# Patient Record
Sex: FEMALE | Race: WHITE | Employment: OTHER | URBAN - METROPOLITAN AREA
[De-identification: names, ages, dates, MRNs, and addresses within clinical notes are randomized per-mention and may not be internally consistent; named-entity substitution may affect disease eponyms.]

---

## 2018-12-29 ENCOUNTER — TELEPHONE (OUTPATIENT)
Dept: OTHER | Age: 53
End: 2018-12-29

## 2019-05-22 ENCOUNTER — OFFICE VISIT (OUTPATIENT)
Dept: ORTHOPEDIC SURGERY | Age: 54
End: 2019-05-22
Payer: COMMERCIAL

## 2019-05-22 VITALS — HEIGHT: 64 IN | BODY MASS INDEX: 29.88 KG/M2 | WEIGHT: 175.04 LBS

## 2019-05-22 DIAGNOSIS — M79.671 FOOT PAIN, RIGHT: Primary | ICD-10-CM

## 2019-05-22 PROCEDURE — 99203 OFFICE O/P NEW LOW 30 MIN: CPT | Performed by: ORTHOPAEDIC SURGERY

## 2019-05-22 PROCEDURE — 3017F COLORECTAL CA SCREEN DOC REV: CPT | Performed by: ORTHOPAEDIC SURGERY

## 2019-05-22 PROCEDURE — 1036F TOBACCO NON-USER: CPT | Performed by: ORTHOPAEDIC SURGERY

## 2019-05-22 PROCEDURE — G8427 DOCREV CUR MEDS BY ELIG CLIN: HCPCS | Performed by: ORTHOPAEDIC SURGERY

## 2019-05-22 PROCEDURE — G8417 CALC BMI ABV UP PARAM F/U: HCPCS | Performed by: ORTHOPAEDIC SURGERY

## 2019-05-22 NOTE — PROGRESS NOTES
Chief Complaint    Foot Pain (R FOOT PAIN)      History of Present Illness: Sridevi Mathews is a 47 y.o. femalehere for evaluation chief complaint of right midfoot arthritis. I saw her in 2013. She states she's done the nonoperative treatment and is now ready for something surgical..        Medical History:  Patient's medications, allergies, past medical, surgical, social and family histories were reviewed and updated as appropriate. Review of Systems:  Pertinent items are noted in HPI  Review of systems reviewed from Patient History Form dated 5/22/19 and available in the patient's chart under the Media tab. Vital Signs:  Ht 5' 4.02\" (1.626 m)   Wt 175 lb 0.7 oz (79.4 kg)   BMI 30.03 kg/m²     General Exam:   Constitutional: Patient is adequately groomed with no evidence of malnutrition  DTRs: Deep tendon reflexes are intact  Mental Status: The patient is oriented to time, place and person. The patient's mood and affect are appropriate. Lymphatic: The lymphatic examination bilaterally reveals all areas to be without enlargement or induration. Foot Examination:    Inspection:  Obvious swelling through the right midfoot    Palpation:  Tenderness over the second TMT joint and naviculomedial cuneiform joints    Range of Motion:  Tight gastrocs    Strength: Within normal limits into dorsiflexion plantar flexion    Special Tests:  Pain with mobilization through the midfoot    Skin: There are no rashes, ulcerations or lesions. Gait: Antalgic    Reflex 2+ and symmetric    Additional Comments:       Additional Examinations:         Left Lower Extremity: Examination of the left lower extremity does not show any tenderness, deformity or injury. Range of motion is unremarkable. There is no gross instability. There are no rashes, ulcerations or lesions.   Strength and tone are normal.    Radiology:     X-rays obtained and reviewed in office:  Views 3  Location right foot  Impression shows evidence of midfoot arthritis mainly at the second TMT joint but also at the naviculomedial cuneiform joint          Assessment :  Right midfoot arthritis    Impression:  Encounter Diagnosis   Name Primary?  Foot pain, right Yes       Office Procedures:  Orders Placed This Encounter   Procedures    XR FOOT RIGHT (MIN 3 VIEWS)     Standing Status:   Future     Number of Occurrences:   1     Standing Expiration Date:   5/20/2020    CT FOOT RIGHT WO CONTRAST     Standing Status:   Future     Standing Expiration Date:   5/22/2020     Scheduling Instructions:      [facility]             [phone] [address] [facility] SCAN ONCE APPROVED            Parish Rivas [phone] TO GET RONNIE FOR FOLLOW UP     Order Specific Question:   Reason for exam:     Answer:   R FOOT PAIN, SPUR        Treatment Plan:  The etiology of midfoot arthritis and it's appropriate treatment were discussed in great detail. I spent 30 minutes with this patient greater than 50% of the time face-to-face discussing treatment options. I would always recommend she do everything nonoperatively for as long as possible and recommended that she do custom inserts and Celebrex which she is taken previously. She understands the risks of that medicine. She states she has done the nonoperative and would like to proceed with surgery. I would recommend a CT scan and then second TMT fusion possible navicular low medial and intermediate cuneiform fusions. Risks and benefits of surgery were discussed in great detail in her plan of care was copied to the media section of her chart.   All questions were answered no guarantees were given or implied

## 2019-05-23 ENCOUNTER — TELEPHONE (OUTPATIENT)
Dept: ORTHOPEDIC SURGERY | Age: 54
End: 2019-05-23

## 2019-05-23 NOTE — TELEPHONE ENCOUNTER
SHELBI WITH PATIENT LETTING HER KNOW HER CT R FOOT HAS BEEN APPROVED.  [facility] TO GET A FOLLOW UP RONNIE [place] [phone]

## 2019-05-28 ENCOUNTER — TELEPHONE (OUTPATIENT)
Dept: ORTHOPEDIC SURGERY | Age: 54
End: 2019-05-28

## 2019-05-28 NOTE — TELEPHONE ENCOUNTER
PATIENT [facility] HER CT AT [facility], 5/29/19. SHE DE [facility] A FOLLOW UP RONNIE FOR 6/14/19 @ 8:15AM COURTNEY.

## 2019-06-28 ENCOUNTER — TELEPHONE (OUTPATIENT)
Dept: ORTHOPEDIC SURGERY | Age: 54
End: 2019-06-28

## 2019-07-19 ENCOUNTER — OFFICE VISIT (OUTPATIENT)
Dept: ORTHOPEDIC SURGERY | Age: 54
End: 2019-07-19
Payer: COMMERCIAL

## 2019-07-19 VITALS — WEIGHT: 175.04 LBS | BODY MASS INDEX: 29.88 KG/M2 | HEIGHT: 64 IN

## 2019-07-19 DIAGNOSIS — M19.071 ARTHRITIS OF FOOT, RIGHT: Primary | ICD-10-CM

## 2019-07-19 PROCEDURE — G8417 CALC BMI ABV UP PARAM F/U: HCPCS | Performed by: ORTHOPAEDIC SURGERY

## 2019-07-19 PROCEDURE — 3017F COLORECTAL CA SCREEN DOC REV: CPT | Performed by: ORTHOPAEDIC SURGERY

## 2019-07-19 PROCEDURE — 99213 OFFICE O/P EST LOW 20 MIN: CPT | Performed by: ORTHOPAEDIC SURGERY

## 2019-07-19 PROCEDURE — G8428 CUR MEDS NOT DOCUMENT: HCPCS | Performed by: ORTHOPAEDIC SURGERY

## 2019-07-19 PROCEDURE — 1036F TOBACCO NON-USER: CPT | Performed by: ORTHOPAEDIC SURGERY

## 2019-07-19 NOTE — PROGRESS NOTES
Subjective: Patient is here for follow-up of her right midfoot arthritis. She is here for her CT scan results and would like to discuss surgical options. Objective: Physical exam shows mild swelling through the right midfoot. She has exquisite tenderness at the second TMT joint greater than navicular old medial and intermediate cuneiform joints. Pain with mobilization through the midfoot strength is 4/5 she is 20 degrees of dorsiflexion 40 degrees of plantarflexion  Imaging: CT scan shows midfoot arthritis specifically at the second TMT joint as well as naviculocuneiform's  Assessment and plan: I spent 30 minutes with this patient greater than 50% of the time face-to-face discussing treatment options. I reviewed the operative option which would be a midfoot fusion involving the second TMT joint navicular medial and intermediate cuneiforms. I would do a bone marrow aspirate and possible bone graft. I reviewed the rehab afterwards and the risks and benefits of surgery and she demonstrates good understanding of all of these risks.   All questions were answered no guarantees were given or implied

## 2019-12-05 DIAGNOSIS — M19.071 ARTHRITIS OF FOOT, RIGHT: Primary | ICD-10-CM

## 2019-12-05 RX ORDER — CELECOXIB 200 MG/1
200 CAPSULE ORAL DAILY
Qty: 30 CAPSULE | Refills: 2 | Status: SHIPPED | OUTPATIENT
Start: 2019-12-05 | End: 2020-04-02

## 2020-01-17 ENCOUNTER — APPOINTMENT (OUTPATIENT)
Dept: GENERAL RADIOLOGY | Age: 55
End: 2020-01-17
Payer: COMMERCIAL

## 2020-01-17 ENCOUNTER — HOSPITAL ENCOUNTER (EMERGENCY)
Age: 55
Discharge: HOME | End: 2020-01-17
Attending: EMERGENCY MEDICINE
Payer: COMMERCIAL

## 2020-01-17 VITALS
RESPIRATION RATE: 15 BRPM | DIASTOLIC BLOOD PRESSURE: 78 MMHG | OXYGEN SATURATION: 97 % | HEIGHT: 64 IN | WEIGHT: 188 LBS | HEART RATE: 62 BPM | SYSTOLIC BLOOD PRESSURE: 130 MMHG | BODY MASS INDEX: 32.1 KG/M2 | TEMPERATURE: 97.8 F

## 2020-01-17 PROCEDURE — 99283 EMERGENCY DEPT VISIT LOW MDM: CPT

## 2020-01-17 PROCEDURE — 71101 X-RAY EXAM UNILAT RIBS/CHEST: CPT

## 2020-01-17 PROCEDURE — 6370000000 HC RX 637 (ALT 250 FOR IP): Performed by: EMERGENCY MEDICINE

## 2020-01-17 RX ORDER — HYDROCODONE BITARTRATE AND ACETAMINOPHEN 5; 325 MG/1; MG/1
1 TABLET ORAL ONCE
Status: COMPLETED | OUTPATIENT
Start: 2020-01-17 | End: 2020-01-17

## 2020-01-17 RX ORDER — IBUPROFEN 600 MG/1
600 TABLET ORAL ONCE
Status: COMPLETED | OUTPATIENT
Start: 2020-01-17 | End: 2020-01-17

## 2020-01-17 RX ORDER — HYDROCODONE BITARTRATE AND ACETAMINOPHEN 5; 325 MG/1; MG/1
1 TABLET ORAL EVERY 4 HOURS PRN
Qty: 18 TABLET | Refills: 0 | Status: SHIPPED | OUTPATIENT
Start: 2020-01-17 | End: 2020-01-20

## 2020-01-17 RX ORDER — LEVOFLOXACIN 500 MG/1
500 TABLET, FILM COATED ORAL DAILY
Qty: 7 TABLET | Refills: 0 | Status: SHIPPED | OUTPATIENT
Start: 2020-01-17 | End: 2020-01-24

## 2020-01-17 RX ADMIN — IBUPROFEN 600 MG: 600 TABLET, FILM COATED ORAL at 13:29

## 2020-01-17 RX ADMIN — HYDROCODONE BITARTRATE AND ACETAMINOPHEN 1 TABLET: 5; 325 TABLET ORAL at 13:29

## 2020-01-17 ASSESSMENT — PAIN DESCRIPTION - LOCATION: LOCATION: BACK

## 2020-01-17 ASSESSMENT — PAIN DESCRIPTION - PAIN TYPE: TYPE: ACUTE PAIN

## 2020-01-17 ASSESSMENT — PAIN SCALES - GENERAL
PAINLEVEL_OUTOF10: 4
PAINLEVEL_OUTOF10: 4

## 2020-01-17 ASSESSMENT — PAIN DESCRIPTION - ORIENTATION: ORIENTATION: RIGHT;LOWER

## 2020-01-17 ASSESSMENT — PAIN DESCRIPTION - DESCRIPTORS: DESCRIPTORS: THROBBING

## 2020-01-17 NOTE — ED TRIAGE NOTES
William Berry is a 48 y/o F who presents to the ED via EMS for 4 out of 10 lower right back pain. Pt reports symptoms began approx 1200. Pt also reports recovering from bronchitis and was in the shower when she coughed and felt a crack in the back followed by throbbing pain. Pt resting in bed with call light within reach and updated on plan of care; pending provider to assess. Pt denies any needs at this time.

## 2020-01-17 NOTE — ED NOTES
Educated pt on 2 prescriptions provided. Referrals and specific instructions were reviewed with the pt, and pt denies any questions at this time. Pt does not appear to be in distress and departed the ED via ambulation to 's POV, with all personal belongings.        Ariana Bear RN  01/17/20 0601

## 2020-01-17 NOTE — ED NOTES
Updated pt's medication record per medication list provided from home. List is believed to be complete and accurate at this time.      Melissa Gilbert RN  01/17/20 [id number]

## 2020-01-17 NOTE — ED PROVIDER NOTES
[facility]  ED  EMERGENCY DEPARTMENT ENCOUNTER      Pt Name: Renee Hernández  MRN: [id number]  [place] 1965  Date of evaluation: 1/17/2020  Provider: Josiah Patel MD    CHIEF COMPLAINT       Chief Complaint   Patient presents with    Back Pain     recovering from bronchitis. Coughed in shower and felt crack in back then 4 out of 10 pain only with movement (2 out of 10 at rest). Pt ambulated approx 12 ft from EMS cot to stretcher with steady gait. HISTORY OF PRESENT ILLNESS   (Location/Symptom, Timing/Onset, Context/Setting, Quality, Duration, Modifying Factors, Severity)  Note limiting factors. Renee Hernández is a 49 y.o. female with past medical history of no significant illness here today for rib pain. Patient states that she is overcoming bronchitis and a cough at home. She states just prior to arrival she bent over and coughed at the same time and had immediate onset of sharp stabbing pain in her right back. She states \"I felt a crack\". She is concerned she may have broken a rib while coughing. No shortness of breath. No wheezing. No fevers. Her cough has been improving. No lower extremity swelling, redness or pain. No hemoptysis. Pain moderate worse with inspiration    HPI    Nursing Notes were reviewed. REVIEW OF SYSTEMS    (2-9 systems for level 4, 10 or more for level 5)     Review of Systems    Please see HPI for pertinent positive and negative review of system findings. A full 10 system ROS was performed and otherwise negative. PAST MEDICAL HISTORY     Past Medical History:   Diagnosis Date    Arthritis          SURGICAL HISTORY       Past Surgical History:   Procedure Laterality Date    FOOT FUSION Left     HIP FUSION Right          CURRENT MEDICATIONS       Previous Medications    CELECOXIB (CELEBREX) 200 MG CAPSULE    Take 1 capsule by mouth daily    THERAPEUTIC MULTIVITAMIN-MINERALS (THERAGRAN-M) TABLET    Take 1 tablet by mouth daily.

## 2020-03-30 ENCOUNTER — TELEPHONE (OUTPATIENT)
Dept: INTERNAL MEDICINE CLINIC | Age: 55
End: 2020-03-30

## 2020-03-30 NOTE — TELEPHONE ENCOUNTER
Patient called back regarding voicemail from Rachel, [address] [place]. Patient has had Tip or [employer] in past but  Not for mercy. Emailed her login instructions and [employer] activation code. Will call patient back to schedule mychart video visit in 15 mins.

## 2020-04-02 ENCOUNTER — TELEMEDICINE (OUTPATIENT)
Dept: INTERNAL MEDICINE CLINIC | Age: 55
End: 2020-04-02
Payer: COMMERCIAL

## 2020-04-02 VITALS
HEIGHT: 64 IN | HEART RATE: 72 BPM | WEIGHT: 176 LBS | SYSTOLIC BLOOD PRESSURE: 118 MMHG | TEMPERATURE: 96 F | BODY MASS INDEX: 30.05 KG/M2 | DIASTOLIC BLOOD PRESSURE: 83 MMHG

## 2020-04-02 PROCEDURE — 99386 PREV VISIT NEW AGE 40-64: CPT | Performed by: INTERNAL MEDICINE
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
